# Patient Record
Sex: MALE | Race: OTHER | HISPANIC OR LATINO | URBAN - METROPOLITAN AREA
[De-identification: names, ages, dates, MRNs, and addresses within clinical notes are randomized per-mention and may not be internally consistent; named-entity substitution may affect disease eponyms.]

---

## 2019-03-07 ENCOUNTER — EMERGENCY (EMERGENCY)
Facility: HOSPITAL | Age: 59
LOS: 1 days | Discharge: HOME | End: 2019-03-07
Attending: EMERGENCY MEDICINE | Admitting: EMERGENCY MEDICINE
Payer: COMMERCIAL

## 2019-03-07 VITALS
OXYGEN SATURATION: 98 % | TEMPERATURE: 98 F | SYSTOLIC BLOOD PRESSURE: 158 MMHG | RESPIRATION RATE: 16 BRPM | DIASTOLIC BLOOD PRESSURE: 72 MMHG | HEART RATE: 97 BPM

## 2019-03-07 VITALS
DIASTOLIC BLOOD PRESSURE: 88 MMHG | RESPIRATION RATE: 18 BRPM | HEART RATE: 97 BPM | OXYGEN SATURATION: 98 % | SYSTOLIC BLOOD PRESSURE: 135 MMHG | TEMPERATURE: 99 F | WEIGHT: 64.37 LBS

## 2019-03-07 LAB
ALBUMIN SERPL ELPH-MCNC: 4.4 G/DL — SIGNIFICANT CHANGE UP (ref 3.3–5)
ALP SERPL-CCNC: 65 U/L — SIGNIFICANT CHANGE UP (ref 40–120)
ALT FLD-CCNC: 14 U/L — SIGNIFICANT CHANGE UP (ref 10–45)
ANION GAP SERPL CALC-SCNC: 9 MMOL/L — SIGNIFICANT CHANGE UP (ref 5–17)
APTT BLD: 33.5 SEC — SIGNIFICANT CHANGE UP (ref 27.5–36.3)
AST SERPL-CCNC: 26 U/L — SIGNIFICANT CHANGE UP (ref 10–40)
BASOPHILS # BLD AUTO: 0.04 K/UL — SIGNIFICANT CHANGE UP (ref 0–0.2)
BASOPHILS NFR BLD AUTO: 0.5 % — SIGNIFICANT CHANGE UP (ref 0–2)
BILIRUB SERPL-MCNC: 0.4 MG/DL — SIGNIFICANT CHANGE UP (ref 0.2–1.2)
BUN SERPL-MCNC: 17 MG/DL — SIGNIFICANT CHANGE UP (ref 7–23)
CALCIUM SERPL-MCNC: 9.4 MG/DL — SIGNIFICANT CHANGE UP (ref 8.4–10.5)
CHLORIDE SERPL-SCNC: 104 MMOL/L — SIGNIFICANT CHANGE UP (ref 96–108)
CO2 SERPL-SCNC: 31 MMOL/L — SIGNIFICANT CHANGE UP (ref 22–31)
CREAT SERPL-MCNC: 0.79 MG/DL — SIGNIFICANT CHANGE UP (ref 0.5–1.3)
EOSINOPHIL # BLD AUTO: 0.31 K/UL — SIGNIFICANT CHANGE UP (ref 0–0.5)
EOSINOPHIL NFR BLD AUTO: 4.2 % — SIGNIFICANT CHANGE UP (ref 0–6)
GLUCOSE SERPL-MCNC: 142 MG/DL — HIGH (ref 70–99)
HCT VFR BLD CALC: 42 % — SIGNIFICANT CHANGE UP (ref 39–50)
HGB BLD-MCNC: 13.6 G/DL — SIGNIFICANT CHANGE UP (ref 13–17)
IMM GRANULOCYTES NFR BLD AUTO: 0.3 % — SIGNIFICANT CHANGE UP (ref 0–1.5)
INR BLD: 0.97 — SIGNIFICANT CHANGE UP (ref 0.88–1.16)
LYMPHOCYTES # BLD AUTO: 1.34 K/UL — SIGNIFICANT CHANGE UP (ref 1–3.3)
LYMPHOCYTES # BLD AUTO: 18.3 % — SIGNIFICANT CHANGE UP (ref 13–44)
MCHC RBC-ENTMCNC: 31.1 PG — SIGNIFICANT CHANGE UP (ref 27–34)
MCHC RBC-ENTMCNC: 32.4 GM/DL — SIGNIFICANT CHANGE UP (ref 32–36)
MCV RBC AUTO: 96.1 FL — SIGNIFICANT CHANGE UP (ref 80–100)
MONOCYTES # BLD AUTO: 0.75 K/UL — SIGNIFICANT CHANGE UP (ref 0–0.9)
MONOCYTES NFR BLD AUTO: 10.2 % — SIGNIFICANT CHANGE UP (ref 2–14)
NEUTROPHILS # BLD AUTO: 4.86 K/UL — SIGNIFICANT CHANGE UP (ref 1.8–7.4)
NEUTROPHILS NFR BLD AUTO: 66.5 % — SIGNIFICANT CHANGE UP (ref 43–77)
NRBC # BLD: 0 /100 WBCS — SIGNIFICANT CHANGE UP (ref 0–0)
PLATELET # BLD AUTO: 243 K/UL — SIGNIFICANT CHANGE UP (ref 150–400)
POTASSIUM SERPL-MCNC: 4.2 MMOL/L — SIGNIFICANT CHANGE UP (ref 3.5–5.3)
POTASSIUM SERPL-SCNC: 4.2 MMOL/L — SIGNIFICANT CHANGE UP (ref 3.5–5.3)
PROT SERPL-MCNC: 7.7 G/DL — SIGNIFICANT CHANGE UP (ref 6–8.3)
PROTHROM AB SERPL-ACNC: 11 SEC — SIGNIFICANT CHANGE UP (ref 10–12.9)
RBC # BLD: 4.37 M/UL — SIGNIFICANT CHANGE UP (ref 4.2–5.8)
RBC # FLD: 13.6 % — SIGNIFICANT CHANGE UP (ref 10.3–14.5)
SODIUM SERPL-SCNC: 144 MMOL/L — SIGNIFICANT CHANGE UP (ref 135–145)
WBC # BLD: 7.32 K/UL — SIGNIFICANT CHANGE UP (ref 3.8–10.5)
WBC # FLD AUTO: 7.32 K/UL — SIGNIFICANT CHANGE UP (ref 3.8–10.5)

## 2019-03-07 PROCEDURE — 99284 EMERGENCY DEPT VISIT MOD MDM: CPT | Mod: 25

## 2019-03-07 PROCEDURE — 85730 THROMBOPLASTIN TIME PARTIAL: CPT

## 2019-03-07 PROCEDURE — 80053 COMPREHEN METABOLIC PANEL: CPT

## 2019-03-07 PROCEDURE — 85025 COMPLETE CBC W/AUTO DIFF WBC: CPT

## 2019-03-07 PROCEDURE — 70450 CT HEAD/BRAIN W/O DYE: CPT

## 2019-03-07 PROCEDURE — 85610 PROTHROMBIN TIME: CPT

## 2019-03-07 PROCEDURE — 70450 CT HEAD/BRAIN W/O DYE: CPT | Mod: 26

## 2019-03-07 PROCEDURE — 99284 EMERGENCY DEPT VISIT MOD MDM: CPT

## 2019-03-07 RX ORDER — LABETALOL HCL 100 MG
1 TABLET ORAL
Qty: 0 | Refills: 0 | COMMUNITY

## 2019-03-07 RX ORDER — AMLODIPINE BESYLATE 2.5 MG/1
1 TABLET ORAL
Qty: 0 | Refills: 0 | COMMUNITY

## 2019-03-07 RX ORDER — INDAPAMIDE 1.25 MG
1 TABLET ORAL
Qty: 0 | Refills: 0 | COMMUNITY

## 2019-03-07 NOTE — ED ADULT NURSE NOTE - CHIEF COMPLAINT QUOTE
pt referred by MD Jennings for an MRI and orthopedic evaluation to r/o rotator cuff injury and ulnar nerve damage. pt c/o right arm and hand weakness, decreased ROM and numbness x 6days. pt denies falls or recent injuries.

## 2019-03-07 NOTE — ED PROVIDER NOTE - CLINICAL SUMMARY MEDICAL DECISION MAKING FREE TEXT BOX
60 yo with new numbness / weaknss at arm and achyness at rt shoulder, suspect herniated disk w impingement , cord compression verses peripheral nerve palsy, doubt CVA however will screen w CT, as has been a week if CT ok unlikely CVA, will MR neck to r/o surgical emergency, If both MR and CT ok will place in shoulder sling and advise f/u with orthopedics.

## 2019-03-07 NOTE — ED ADULT NURSE REASSESSMENT NOTE - NS ED NURSE REASSESS COMMENT FT1
Patient refused MRI, stating he is "way too claustrophobic," sent back from MRI. Dr Duncan aware and spoke with patient. Active ROM intact and pt reports improvement in numbness. For discharge.

## 2019-03-07 NOTE — ED PROVIDER NOTE - OBJECTIVE STATEMENT
60 yo , reports achiness in right shoulder and numbness in pinky and ring finger Rt hand starting 5 days ago, last 3 days with new difficulty raising right arm, denies falling asleep w arm draped over any objects / chairs however reports he frequently changes position while sleeping, drinks a pint approx. 3 times weekly,  no headache, no neck pain, no recent illness, no ho of shoulder problems, no slurred speech, no confusion.

## 2019-03-07 NOTE — ED PROVIDER NOTE - CARE PROVIDER_API CALL
Haile Beltran)  Neurology; Neuromuscular Medicine  [address], [address] [place] [address]  Phone: [phone]  Fax: [phone]  Follow Up Time:     Barry Hadley)  Orthopaedic Surgery Surgery  [address] [address]  Phone: [phone]  Fax: [phone]  Follow Up Time:

## 2019-03-07 NOTE — ED ADULT NURSE NOTE - OBJECTIVE STATEMENT
Pt sent to ED by his PCP for eval of R arm weakness and inability to actively raise R arm (passive ROM intact), with associated R 4th and 5th digit numbness without tingling x6 days. Denies injury/trauma, neck pain, arm pain. Hx HTN.

## 2019-03-07 NOTE — ED PROVIDER NOTE - SHIFT CHANGE DETAILS
signed out pending mr c spine.  patient returned from mri refusing to do test.  says he didn't know he was claustrophobic but can't handle it and refuses to go back and try.  offered medication for anxiety but still refusing.  says he feels better now and doesn't think it is necessary.  able to range arm normally.  no pain.  still has mild numbness in digits 4-5 only on right.  discussed concern for possible nerve impingement/ disc herniation and desire to r/o serious pathology but still refusing.  will refer to ortho/ neuro outpatient for further evaluation.  return precautions discussed signed out pending mr c spine.  patient returned from mri refusing to do test.  says he didn't know he was claustrophobic but can't handle it and refuses to go back and try.  offered medication for anxiety but still refusing.  says he feels better now and doesn't think it is necessary.  able to range arm normally.  no pain.  still has mild numbness in digits 4-5 only on right.  discussed concern for possible nerve impingement/ disc herniation and desire to r/o serious pathology but still refusing.  will refer to ortho/ neuro outpatient for further evaluation.  suspect peripheral compression given improvement in other symptoms while in the ed. return precautions discussed

## 2019-03-07 NOTE — ED PROVIDER NOTE - CARE PROVIDERS DIRECT ADDRESSES
[email or web address]

## 2019-03-07 NOTE — ED ADULT NURSE NOTE - NSIMPLEMENTINTERV_GEN_ALL_ED
Implemented All Universal Safety Interventions:  [place] to call system. Call bell, personal items and telephone within reach. Instruct patient to call for assistance. Room bathroom lighting operational. Non-slip footwear when patient is off stretcher. Physically safe environment: no spills, clutter or unnecessary equipment. Stretcher in lowest position, wheels locked, appropriate side rails in place.

## 2019-03-07 NOTE — ED PROVIDER NOTE - PHYSICAL EXAMINATION
SENSATION deficit to Ulnar distribution at hand, ( pinky / ring finger) slight sensation deficit to medial aspect of inner forearm, inability to abduct right arm at shoulder, internal and external rotation intact, bicep / triceps strength intact, hand strength in tact. pulses intact, no wrist drop,

## 2019-03-07 NOTE — ED PROVIDER NOTE - NSFOLLOWUPINSTRUCTIONS_ED_ALL_ED_FT
Please see referral to neurologist and orthopedics if numbness or weakness persist.  We wanted to do an MRI of your neck today but you refused.  This may need to be scheduled by your doctor outpatient if symptoms do not resolve.  Return to the ER if symptoms worsen or other concerns.    Paresthesia    WHAT YOU NEED TO KNOW:    Paresthesia is numbness, tingling, or burning. It can happen in any part of your body, but usually occurs in your legs, feet, arms, or hands.    DISCHARGE INSTRUCTIONS:    Return to the emergency department if:     You have severe pain along with numbness and tingling.      Your legs suddenly become cold. You have trouble moving your legs, and they ache.      You have increased weakness in a part of your body.      You have uncontrolled movements.    Contact your healthcare provider or neurologist if:     Your symptoms do not improve.      You have symptoms in more than one part of your body.      You have questions or concerns about your condition or care.    Manage paresthesia:     Protect the area from injury. You may injure or burn yourself if you lose feeling in the area. Be careful when you touch anything that could be hot. Wear sturdy shoes to protect your feet. Ask about other ways to protect yourself.       Go to physical or occupational therapy if directed. Your provider may recommend therapy if you have a condition such as carpal tunnel syndrome. A physical therapist can teach you exercises to help strengthen the area or increase your ability to move it. An occupational therapist can help you find new ways to do your daily activities.      Manage health conditions that can cause paresthesia. Work with your diabetes specialist if you have uncontrolled diabetes. A dietitian or your healthcare provider can help you create a meal plan if you have low vitamin B levels. Your provider can help you manage your health if you have multiple sclerosis or you had a stroke. It is important to manage health conditions to stop paresthesia or prevent it from getting worse.    Follow up with your healthcare provider or neurologist as directed: Your healthcare provider may refer you to a specialist. Write down your questions so you remember to ask them during your visits.

## 2019-03-08 ENCOUNTER — INBOUND DOCUMENT (OUTPATIENT)
Age: 59
End: 2019-03-08

## 2019-03-08 PROBLEM — Z00.00 ENCOUNTER FOR PREVENTIVE HEALTH EXAMINATION: Status: ACTIVE | Noted: 2019-03-08

## 2019-03-11 DIAGNOSIS — R20.0 ANESTHESIA OF SKIN: ICD-10-CM

## 2019-03-11 DIAGNOSIS — R20.2 PARESTHESIA OF SKIN: ICD-10-CM

## 2019-03-11 DIAGNOSIS — I10 ESSENTIAL (PRIMARY) HYPERTENSION: ICD-10-CM

## 2019-03-11 DIAGNOSIS — Z79.899 OTHER LONG TERM (CURRENT) DRUG THERAPY: ICD-10-CM

## 2019-03-19 PROBLEM — I10 ESSENTIAL (PRIMARY) HYPERTENSION: Chronic | Status: ACTIVE | Noted: 2019-03-07

## 2019-04-16 ENCOUNTER — APPOINTMENT (OUTPATIENT)
Dept: ORTHOPEDIC SURGERY | Facility: CLINIC | Age: 59
End: 2019-04-16
Payer: COMMERCIAL

## 2019-04-16 VITALS
HEART RATE: 85 BPM | SYSTOLIC BLOOD PRESSURE: 143 MMHG | DIASTOLIC BLOOD PRESSURE: 88 MMHG | HEIGHT: 68 IN | BODY MASS INDEX: 21.52 KG/M2 | OXYGEN SATURATION: 98 % | WEIGHT: 142 LBS

## 2019-04-16 DIAGNOSIS — Z78.9 OTHER SPECIFIED HEALTH STATUS: ICD-10-CM

## 2019-04-16 DIAGNOSIS — R20.2 PARESTHESIA OF SKIN: ICD-10-CM

## 2019-04-16 DIAGNOSIS — Z86.79 PERSONAL HISTORY OF OTHER DISEASES OF THE CIRCULATORY SYSTEM: ICD-10-CM

## 2019-04-16 DIAGNOSIS — M54.12 RADICULOPATHY, CERVICAL REGION: ICD-10-CM

## 2019-04-16 PROCEDURE — 72050 X-RAY EXAM NECK SPINE 4/5VWS: CPT

## 2019-04-16 PROCEDURE — 99203 OFFICE O/P NEW LOW 30 MIN: CPT

## 2019-04-16 RX ORDER — INDAPAMIDE 1.25 MG/1
1.25 TABLET, FILM COATED ORAL
Qty: 90 | Refills: 0 | Status: ACTIVE | COMMUNITY
Start: 2019-01-24

## 2019-04-16 RX ORDER — LABETALOL HYDROCHLORIDE 100 MG/1
100 TABLET, FILM COATED ORAL
Qty: 180 | Refills: 0 | Status: ACTIVE | COMMUNITY
Start: 2019-04-03

## 2019-04-16 RX ORDER — AMLODIPINE BESYLATE 5 MG/1
5 TABLET ORAL
Qty: 90 | Refills: 0 | Status: ACTIVE | COMMUNITY
Start: 2018-12-20

## 2019-04-16 NOTE — PHYSICAL EXAM
[Rad] : radial 2+ and symmetric bilaterally [UE] : Sensory: Intact in bilateral upper extremities [Normal RUE] : Right Upper Extremity: No scars, rashes, lesions, ulcers, skin intact [Normal LUE] : Left Upper Extremity: No scars, rashes, lesions, ulcers, skin intact [Normal Touch] : sensation intact for touch [Normal] : Oriented to person, place, and time, insight and judgement were intact and the affect was normal [de-identified] : Cervical spine and bilateral shoulders\par \par Cervical spine range of motion is without any limitation or pain with extension, rotation and side to side bending area and\par Full range of motion bilateral shoulders without pain with 180° forward elevation, internal rotation to T9 and 80° external rotation.\par Negative Neer and Márquez impingement tests.\par 5/5 rotator cuff strength including supraspinatus, internal rotation and external rotation.\par Deep tendon reflexes are 2+ LEFT biceps, brachioradialis and 1+ at the triceps. On the RIGHT deep tendon reflexes are 2+ biceps, 1+ brachioradialis and absent on the triceps.\par Sensation is subjectively diminished in the RIGHT fifth finger but otherwise intact throughout.\par Intact  strength and finger abduction.\par No intrinsic wasting is visible.\par Negative Tinel's at the cubital tunnel and carpal tunnel.\par Full elbow range of motion without pain and no subluxation of the ulnar nerve is seen. [de-identified] : No respiratory distress [de-identified] : \par X-rays of the Cervical spine today 4 views show Mild degenerative changes/osteophytes on the vertebrae but well-maintained disc spaces and no significant foraminal narrowing\par \par EXAM: CT BRAIN \par PROCEDURE DATE: 03/07/2019 \par INTERPRETATION: INDICATIONS: Right arm weakness and numbness, rule out \par intracranial to follow \par IMPRESSION: \par 1. No acute intracranial hemorrhage or transcortical infarct \par 2. Microangiopathic disease

## 2019-04-16 NOTE — HISTORY OF PRESENT ILLNESS
[de-identified] : Mr. Vasquez is a 59-year-old who comes in with numbness and tingling in his hand right 4th and 5th fingers. It started Feb 24th. \par He had a little right shoulder pain and stiffness as well but that resolved quickly\par He took aspirin.\par He first saw his internist. Given the way it started he thought he could have had a stroke and sent him to the emergency room\par He had gone to the hospital March 6th with paresthesias to r/o a stroke.\par Paresthesias are resolving. The numbness in the ring finger went away but he still has some numbness in his small finger. No pain.\par He had weakness but is better now. Gripping was difficult.\par  He can  better now.\par Pain is 0/10.\par \par He was referred to neurology but hasn't tried to see a neurologist yet. He hasn't had any other treatment.\par He leaves later this week to go to the [facility] for 3 weeks.\par When he was in the hospital they did a CT scan of his head. They tried to get an MRI of the cervical spine but he is claustrophobic and could not lie in the machine.\par

## 2019-04-16 NOTE — ASSESSMENT
[FreeTextEntry1] : 59-year-old with RIGHT shoulder Hand paresthesias localized primarily to the small finger consistent either with ulnar neuropathy versus cervical radiculopathy. This started about 7 weeks ago. There has been some improvement but there is still numbness. Weakness has decreased. He has no pain. I recommended an MRI of the cervical spine to rule out any nerve compression to explain her numbness and weakness.\par He should see the neurologist as well and may need nerve studies done if this is persisting.\par I will call him with the MRI results.\par I recommended taking some ibuprofen or Aleve for a few days and over-the-counter strength to see if this helps his symptoms.

## 2019-05-24 ENCOUNTER — RX RENEWAL (OUTPATIENT)
Age: 59
End: 2019-05-24
